# Patient Record
Sex: FEMALE | Race: WHITE | Employment: FULL TIME | URBAN - METROPOLITAN AREA
[De-identification: names, ages, dates, MRNs, and addresses within clinical notes are randomized per-mention and may not be internally consistent; named-entity substitution may affect disease eponyms.]

---

## 2017-08-12 PROCEDURE — 86480 TB TEST CELL IMMUN MEASURE: CPT | Performed by: INTERNAL MEDICINE

## 2017-08-12 PROCEDURE — 80074 ACUTE HEPATITIS PANEL: CPT | Performed by: INTERNAL MEDICINE

## 2017-08-12 PROCEDURE — 86200 CCP ANTIBODY: CPT | Performed by: INTERNAL MEDICINE

## 2017-09-02 ENCOUNTER — OFFICE VISIT (OUTPATIENT)
Dept: FAMILY MEDICINE CLINIC | Facility: CLINIC | Age: 47
End: 2017-09-02

## 2017-09-02 VITALS — DIASTOLIC BLOOD PRESSURE: 90 MMHG | SYSTOLIC BLOOD PRESSURE: 160 MMHG

## 2017-09-02 DIAGNOSIS — R03.0 ELEVATED BLOOD PRESSURE READING: ICD-10-CM

## 2017-09-02 DIAGNOSIS — Z01.30 BLOOD PRESSURE CHECK: Primary | ICD-10-CM

## 2017-09-02 NOTE — PROGRESS NOTES
Pt feels well, presents to [facility] for b/p check bc at last dr moffett b/p was found to be high @ 2-3 weeks ago per pt report. Pt feels well today, denies complaints. Denies cp, sob, headache.   B/p elevated in office today  I advised pt on TLC to lower b/p, wri

## 2017-09-02 NOTE — PATIENT INSTRUCTIONS
Discharge Instructions: Eating a Low-Salt Diet  Your health care provider has prescribed a low-salt diet for you. Most people with heart problems need to eat less salt, which is full of sodium.  Too much sodium is linked to high blood pressure, which is l © 4387-2468 The [address] [place] [address], [address] [place]. All rights reserved. This information is not intended as a substitute for professional medical care. Always follow your healthcare professional's instructions.         Eating · One and a half ounces cheese  Best choices: Skim or 1% milk, low-fat or fat-free yogurt or buttermilk, and low-fat cheeses.         Lean meats, poultry, fish  Servings: 6 or fewer a day  A serving is:  · 1 ounce cooked meats, poultry, or fish  · 1 egg  B Risk factors are things that make you more likely to have a disease or condition. Do you know your risk factors for high blood pressure? You can’t do anything about some risk factors. But other risk factors are things that can be changed.  Know what high bl © 3362-1534 [address], [address] [place]. All rights reserved. This information is not intended as a substitute for professional medical care. Always follow your healthcare professional's instructions.

## 2017-09-16 ENCOUNTER — OFFICE VISIT (OUTPATIENT)
Dept: FAMILY MEDICINE CLINIC | Facility: CLINIC | Age: 47
End: 2017-09-16

## 2017-09-16 VITALS — SYSTOLIC BLOOD PRESSURE: 122 MMHG | DIASTOLIC BLOOD PRESSURE: 78 MMHG

## 2017-09-16 DIAGNOSIS — Z01.30 BLOOD PRESSURE CHECK: Primary | ICD-10-CM

## 2017-09-16 NOTE — PROGRESS NOTES
Patient here for blood pressure check. /78 (BP Location: Right arm, Patient Position: Sitting, Cuff Size: large)   Patient given information. Will follow up with doctor as discussed. No charge for visit.

## 2017-09-24 ENCOUNTER — OFFICE VISIT (OUTPATIENT)
Dept: FAMILY MEDICINE CLINIC | Facility: CLINIC | Age: 47
End: 2017-09-24

## 2017-09-24 VITALS — DIASTOLIC BLOOD PRESSURE: 86 MMHG | SYSTOLIC BLOOD PRESSURE: 130 MMHG

## 2017-09-24 DIAGNOSIS — Z01.30 BLOOD PRESSURE CHECK: Primary | ICD-10-CM

## 2017-09-24 NOTE — PROGRESS NOTES
Pt presents for b/p check  Pt has eaten more salt this week  Will watch diet and RTC 1 week for recheck b/p

## 2017-10-05 PROBLEM — I77.810 DILATED AORTIC ROOT (HCC): Status: ACTIVE | Noted: 2017-10-05

## 2017-11-06 ENCOUNTER — OFFICE VISIT (OUTPATIENT)
Dept: FAMILY MEDICINE CLINIC | Facility: CLINIC | Age: 47
End: 2017-11-06

## 2017-11-06 VITALS — SYSTOLIC BLOOD PRESSURE: 138 MMHG | HEART RATE: 82 BPM | DIASTOLIC BLOOD PRESSURE: 82 MMHG

## 2017-11-06 DIAGNOSIS — Z01.30 BLOOD PRESSURE CHECK: Primary | ICD-10-CM

## 2017-11-07 ENCOUNTER — HOSPITAL ENCOUNTER (OUTPATIENT)
Dept: CT IMAGING | Facility: HOSPITAL | Age: 47
Discharge: HOME | End: 2017-11-07
Attending: INTERNAL MEDICINE
Payer: COMMERCIAL

## 2017-11-07 DIAGNOSIS — R07.2 PRECORDIAL PAIN: ICD-10-CM

## 2017-11-07 DIAGNOSIS — Q23.1 BICUSPID AORTIC VALVE: ICD-10-CM

## 2017-11-07 PROCEDURE — 75574 CT ANGIO HRT W/3D IMAGE: CPT | Performed by: INTERNAL MEDICINE

## 2017-11-07 RX ORDER — METOPROLOL TARTRATE 5 MG/5ML
INJECTION INTRAVENOUS
Status: COMPLETED
Start: 2017-11-07 | End: 2017-11-07

## 2017-11-07 RX ORDER — NITROGLYCERIN 0.4 MG/1
TABLET SUBLINGUAL
Status: COMPLETED
Start: 2017-11-07 | End: 2017-11-07

## 2017-11-07 RX ADMIN — NITROGLYCERIN 0.4 MG: 0.4 TABLET SUBLINGUAL at 09:57:00

## 2017-11-07 RX ADMIN — METOPROLOL TARTRATE 5 MG: 5 INJECTION INTRAVENOUS at 09:42:00

## 2017-11-07 RX ADMIN — METOPROLOL TARTRATE 5 MG: 5 INJECTION INTRAVENOUS at 09:29:00

## 2017-11-12 ENCOUNTER — OFFICE VISIT (OUTPATIENT)
Dept: FAMILY MEDICINE CLINIC | Facility: CLINIC | Age: 47
End: 2017-11-12

## 2017-11-12 VITALS — DIASTOLIC BLOOD PRESSURE: 82 MMHG | SYSTOLIC BLOOD PRESSURE: 132 MMHG | HEART RATE: 76 BPM

## 2017-11-12 DIAGNOSIS — Z01.30 BLOOD PRESSURE CHECK: Primary | ICD-10-CM

## 2017-11-12 NOTE — PROGRESS NOTES
Pt presented for BP check. Pt is aymptomatic. Exercises 3d/week in kickboxing class. Trying to watch diet.

## 2018-07-31 ENCOUNTER — NURSE ONLY (OUTPATIENT)
Dept: FAMILY MEDICINE CLINIC | Facility: CLINIC | Age: 48
End: 2018-07-31
Payer: COMMERCIAL

## 2018-07-31 VITALS — HEART RATE: 100 BPM | DIASTOLIC BLOOD PRESSURE: 80 MMHG | SYSTOLIC BLOOD PRESSURE: 160 MMHG

## 2018-07-31 DIAGNOSIS — R03.0 ELEVATED BLOOD PRESSURE READING: ICD-10-CM

## 2018-07-31 DIAGNOSIS — Z01.30 BLOOD PRESSURE CHECK: Primary | ICD-10-CM

## 2018-08-01 NOTE — PROGRESS NOTES
Patient here for BP check. States she is feeling a little off, with headaches and heart racing. /80    Instructed patient to call her PCP in the morning.

## 2018-08-23 PROBLEM — I10 BENIGN ESSENTIAL HTN: Status: ACTIVE | Noted: 2018-08-23

## 2018-08-23 PROBLEM — E66.01 OBESITY, MORBID, BMI 40.0-49.9 (HCC): Status: ACTIVE | Noted: 2018-08-23

## 2018-08-23 PROBLEM — R73.03 PREDIABETES: Status: ACTIVE | Noted: 2018-08-23

## 2018-08-23 PROBLEM — E88.81 INSULIN RESISTANCE: Status: ACTIVE | Noted: 2018-08-23

## 2019-11-12 PROCEDURE — 88305 TISSUE EXAM BY PATHOLOGIST: CPT | Performed by: OBSTETRICS & GYNECOLOGY

## 2023-07-10 ENCOUNTER — ANESTHESIA (OUTPATIENT)
Dept: SURGERY | Facility: HOSPITAL | Age: 53
End: 2023-07-10
Payer: COMMERCIAL

## 2023-07-10 ENCOUNTER — HOSPITAL ENCOUNTER (INPATIENT)
Facility: HOSPITAL | Age: 53
LOS: 1 days | Discharge: HOME | End: 2023-07-11
Attending: ORTHOPAEDIC SURGERY | Admitting: ORTHOPAEDIC SURGERY
Payer: COMMERCIAL

## 2023-07-10 ENCOUNTER — APPOINTMENT (OUTPATIENT)
Dept: GENERAL RADIOLOGY | Facility: HOSPITAL | Age: 53
End: 2023-07-10
Attending: ORTHOPAEDIC SURGERY
Payer: COMMERCIAL

## 2023-07-10 ENCOUNTER — ANESTHESIA EVENT (OUTPATIENT)
Dept: SURGERY | Facility: HOSPITAL | Age: 53
End: 2023-07-10
Payer: COMMERCIAL

## 2023-07-10 ENCOUNTER — HOSPITAL ENCOUNTER (INPATIENT)
Facility: HOSPITAL | Age: 53
LOS: 1 days | Discharge: HOME | DRG: 472 | End: 2023-07-11
Attending: ORTHOPAEDIC SURGERY | Admitting: ORTHOPAEDIC SURGERY
Payer: COMMERCIAL

## 2023-07-10 ENCOUNTER — APPOINTMENT (OUTPATIENT)
Dept: GENERAL RADIOLOGY | Facility: HOSPITAL | Age: 53
DRG: 472 | End: 2023-07-10
Attending: ORTHOPAEDIC SURGERY
Payer: COMMERCIAL

## 2023-07-10 PROBLEM — G95.9 CERVICAL MYELOPATHY (HCC): Status: ACTIVE | Noted: 2023-07-10

## 2023-07-10 LAB
B-HCG UR QL: NEGATIVE
GLUCOSE BLDC GLUCOMTR-MCNC: 91 MG/DL (ref 70–99)

## 2023-07-10 PROCEDURE — 0RB30ZZ EXCISION OF CERVICAL VERTEBRAL DISC, OPEN APPROACH: ICD-10-PCS | Performed by: ORTHOPAEDIC SURGERY

## 2023-07-10 PROCEDURE — 0RG10A0 FUSION OF CERVICAL VERTEBRAL JOINT WITH INTERBODY FUSION DEVICE, ANTERIOR APPROACH, ANTERIOR COLUMN, OPEN APPROACH: ICD-10-PCS | Performed by: ORTHOPAEDIC SURGERY

## 2023-07-10 PROCEDURE — 76000 FLUOROSCOPY <1 HR PHYS/QHP: CPT | Performed by: ORTHOPAEDIC SURGERY

## 2023-07-10 PROCEDURE — 82962 GLUCOSE BLOOD TEST: CPT

## 2023-07-10 PROCEDURE — 81025 URINE PREGNANCY TEST: CPT

## 2023-07-10 DEVICE — PLATE CERV SKY TI 1LVL 12MM: Type: IMPLANTABLE DEVICE | Site: SPINE CERVICAL | Status: FUNCTIONAL

## 2023-07-10 DEVICE — IMPLANTABLE DEVICE: Type: IMPLANTABLE DEVICE | Site: SPINE CERVICAL | Status: FUNCTIONAL

## 2023-07-10 DEVICE — 3.5MM VARIABLE SCREW, SELF TAPPING, 14MM
Type: IMPLANTABLE DEVICE | Site: SPINE CERVICAL | Status: FUNCTIONAL
Brand: SHORELINE ASC

## 2023-07-10 RX ORDER — NALOXONE HYDROCHLORIDE 0.4 MG/ML
80 INJECTION, SOLUTION INTRAMUSCULAR; INTRAVENOUS; SUBCUTANEOUS AS NEEDED
Status: CANCELLED | OUTPATIENT
Start: 2023-07-10 | End: 2023-07-10

## 2023-07-10 RX ORDER — MORPHINE SULFATE 4 MG/ML
4 INJECTION, SOLUTION INTRAMUSCULAR; INTRAVENOUS EVERY 10 MIN PRN
Status: DISCONTINUED | OUTPATIENT
Start: 2023-07-10 | End: 2023-07-10 | Stop reason: HOSPADM

## 2023-07-10 RX ORDER — HYDROMORPHONE HYDROCHLORIDE 1 MG/ML
0.4 INJECTION, SOLUTION INTRAMUSCULAR; INTRAVENOUS; SUBCUTANEOUS EVERY 2 HOUR PRN
Status: DISCONTINUED | OUTPATIENT
Start: 2023-07-10 | End: 2023-07-11

## 2023-07-10 RX ORDER — TRIAMCINOLONE ACETONIDE 40 MG/ML
INJECTION, SUSPENSION INTRA-ARTICULAR; INTRAMUSCULAR AS NEEDED
Status: DISCONTINUED | OUTPATIENT
Start: 2023-07-10 | End: 2023-07-10 | Stop reason: HOSPADM

## 2023-07-10 RX ORDER — DEXAMETHASONE SODIUM PHOSPHATE 4 MG/ML
VIAL (ML) INJECTION AS NEEDED
Status: DISCONTINUED | OUTPATIENT
Start: 2023-07-10 | End: 2023-07-10 | Stop reason: SURG

## 2023-07-10 RX ORDER — METHOCARBAMOL 750 MG/1
750 TABLET, FILM COATED ORAL EVERY 6 HOURS PRN
Status: DISCONTINUED | OUTPATIENT
Start: 2023-07-10 | End: 2023-07-11

## 2023-07-10 RX ORDER — KETAMINE HYDROCHLORIDE 50 MG/ML
INJECTION, SOLUTION, CONCENTRATE INTRAMUSCULAR; INTRAVENOUS AS NEEDED
Status: DISCONTINUED | OUTPATIENT
Start: 2023-07-10 | End: 2023-07-10 | Stop reason: SURG

## 2023-07-10 RX ORDER — HYDROMORPHONE HYDROCHLORIDE 1 MG/ML
0.1 INJECTION, SOLUTION INTRAMUSCULAR; INTRAVENOUS; SUBCUTANEOUS EVERY 2 HOUR PRN
Status: DISCONTINUED | OUTPATIENT
Start: 2023-07-10 | End: 2023-07-11

## 2023-07-10 RX ORDER — MORPHINE SULFATE 4 MG/ML
2 INJECTION, SOLUTION INTRAMUSCULAR; INTRAVENOUS EVERY 10 MIN PRN
Status: CANCELLED | OUTPATIENT
Start: 2023-07-10

## 2023-07-10 RX ORDER — POLYETHYLENE GLYCOL 3350 17 G/17G
17 POWDER, FOR SOLUTION ORAL DAILY PRN
Status: DISCONTINUED | OUTPATIENT
Start: 2023-07-10 | End: 2023-07-11

## 2023-07-10 RX ORDER — BISACODYL 10 MG
10 SUPPOSITORY, RECTAL RECTAL
Status: DISCONTINUED | OUTPATIENT
Start: 2023-07-10 | End: 2023-07-11

## 2023-07-10 RX ORDER — HYDROMORPHONE HYDROCHLORIDE 1 MG/ML
0.4 INJECTION, SOLUTION INTRAMUSCULAR; INTRAVENOUS; SUBCUTANEOUS EVERY 5 MIN PRN
Status: DISCONTINUED | OUTPATIENT
Start: 2023-07-10 | End: 2023-07-10 | Stop reason: HOSPADM

## 2023-07-10 RX ORDER — SODIUM CHLORIDE, SODIUM LACTATE, POTASSIUM CHLORIDE, CALCIUM CHLORIDE 600; 310; 30; 20 MG/100ML; MG/100ML; MG/100ML; MG/100ML
INJECTION, SOLUTION INTRAVENOUS CONTINUOUS
Status: CANCELLED | OUTPATIENT
Start: 2023-07-10

## 2023-07-10 RX ORDER — DIPHENHYDRAMINE HCL 25 MG
25 CAPSULE ORAL EVERY 4 HOURS PRN
Status: DISCONTINUED | OUTPATIENT
Start: 2023-07-10 | End: 2023-07-11

## 2023-07-10 RX ORDER — TIZANIDINE 2 MG/1
2 TABLET ORAL ONCE
Status: COMPLETED | OUTPATIENT
Start: 2023-07-10 | End: 2023-07-10

## 2023-07-10 RX ORDER — METOCLOPRAMIDE 10 MG/1
10 TABLET ORAL ONCE
Status: COMPLETED | OUTPATIENT
Start: 2023-07-10 | End: 2023-07-10

## 2023-07-10 RX ORDER — MIDAZOLAM HYDROCHLORIDE 1 MG/ML
INJECTION INTRAMUSCULAR; INTRAVENOUS AS NEEDED
Status: DISCONTINUED | OUTPATIENT
Start: 2023-07-10 | End: 2023-07-10 | Stop reason: SURG

## 2023-07-10 RX ORDER — PROCHLORPERAZINE EDISYLATE 5 MG/ML
5 INJECTION INTRAMUSCULAR; INTRAVENOUS EVERY 8 HOURS PRN
Status: DISCONTINUED | OUTPATIENT
Start: 2023-07-10 | End: 2023-07-11

## 2023-07-10 RX ORDER — LIDOCAINE HYDROCHLORIDE 10 MG/ML
INJECTION, SOLUTION EPIDURAL; INFILTRATION; INTRACAUDAL; PERINEURAL AS NEEDED
Status: DISCONTINUED | OUTPATIENT
Start: 2023-07-10 | End: 2023-07-10 | Stop reason: SURG

## 2023-07-10 RX ORDER — EPHEDRINE SULFATE 50 MG/ML
INJECTION, SOLUTION INTRAVENOUS AS NEEDED
Status: DISCONTINUED | OUTPATIENT
Start: 2023-07-10 | End: 2023-07-10 | Stop reason: SURG

## 2023-07-10 RX ORDER — NALOXONE HYDROCHLORIDE 0.4 MG/ML
80 INJECTION, SOLUTION INTRAMUSCULAR; INTRAVENOUS; SUBCUTANEOUS AS NEEDED
Status: DISCONTINUED | OUTPATIENT
Start: 2023-07-10 | End: 2023-07-10 | Stop reason: HOSPADM

## 2023-07-10 RX ORDER — OXYCODONE HYDROCHLORIDE 5 MG/1
10 TABLET ORAL ONCE
Status: COMPLETED | OUTPATIENT
Start: 2023-07-10 | End: 2023-07-10

## 2023-07-10 RX ORDER — HYDROMORPHONE HYDROCHLORIDE 1 MG/ML
INJECTION, SOLUTION INTRAMUSCULAR; INTRAVENOUS; SUBCUTANEOUS AS NEEDED
Status: DISCONTINUED | OUTPATIENT
Start: 2023-07-10 | End: 2023-07-10 | Stop reason: SURG

## 2023-07-10 RX ORDER — MORPHINE SULFATE 4 MG/ML
2 INJECTION, SOLUTION INTRAMUSCULAR; INTRAVENOUS EVERY 10 MIN PRN
Status: DISCONTINUED | OUTPATIENT
Start: 2023-07-10 | End: 2023-07-10 | Stop reason: HOSPADM

## 2023-07-10 RX ORDER — HYDROMORPHONE HYDROCHLORIDE 1 MG/ML
0.2 INJECTION, SOLUTION INTRAMUSCULAR; INTRAVENOUS; SUBCUTANEOUS EVERY 2 HOUR PRN
Status: DISCONTINUED | OUTPATIENT
Start: 2023-07-10 | End: 2023-07-11

## 2023-07-10 RX ORDER — HYDROMORPHONE HYDROCHLORIDE 1 MG/ML
0.2 INJECTION, SOLUTION INTRAMUSCULAR; INTRAVENOUS; SUBCUTANEOUS EVERY 5 MIN PRN
Status: CANCELLED | OUTPATIENT
Start: 2023-07-10

## 2023-07-10 RX ORDER — HYDROMORPHONE HYDROCHLORIDE 1 MG/ML
0.6 INJECTION, SOLUTION INTRAMUSCULAR; INTRAVENOUS; SUBCUTANEOUS EVERY 5 MIN PRN
Status: CANCELLED | OUTPATIENT
Start: 2023-07-10

## 2023-07-10 RX ORDER — ACETAMINOPHEN 500 MG
1000 TABLET ORAL ONCE
Status: COMPLETED | OUTPATIENT
Start: 2023-07-10 | End: 2023-07-10

## 2023-07-10 RX ORDER — VANCOMYCIN 2 GRAM/500 ML IN 0.9 % SODIUM CHLORIDE INTRAVENOUS
15 ONCE
Status: COMPLETED | OUTPATIENT
Start: 2023-07-10 | End: 2023-07-10

## 2023-07-10 RX ORDER — DOCUSATE SODIUM 100 MG/1
100 CAPSULE, LIQUID FILLED ORAL 2 TIMES DAILY
Status: DISCONTINUED | OUTPATIENT
Start: 2023-07-10 | End: 2023-07-11

## 2023-07-10 RX ORDER — HYDROMORPHONE HYDROCHLORIDE 1 MG/ML
0.6 INJECTION, SOLUTION INTRAMUSCULAR; INTRAVENOUS; SUBCUTANEOUS EVERY 5 MIN PRN
Status: DISCONTINUED | OUTPATIENT
Start: 2023-07-10 | End: 2023-07-10 | Stop reason: HOSPADM

## 2023-07-10 RX ORDER — ENEMA 19; 7 G/133ML; G/133ML
1 ENEMA RECTAL ONCE AS NEEDED
Status: DISCONTINUED | OUTPATIENT
Start: 2023-07-10 | End: 2023-07-11

## 2023-07-10 RX ORDER — FAMOTIDINE 20 MG/1
20 TABLET, FILM COATED ORAL ONCE
Status: COMPLETED | OUTPATIENT
Start: 2023-07-10 | End: 2023-07-10

## 2023-07-10 RX ORDER — HYDROCODONE BITARTRATE AND ACETAMINOPHEN 10; 325 MG/1; MG/1
1 TABLET ORAL EVERY 4 HOURS PRN
Status: DISCONTINUED | OUTPATIENT
Start: 2023-07-10 | End: 2023-07-11

## 2023-07-10 RX ORDER — MORPHINE SULFATE 10 MG/ML
6 INJECTION, SOLUTION INTRAMUSCULAR; INTRAVENOUS EVERY 10 MIN PRN
Status: CANCELLED | OUTPATIENT
Start: 2023-07-10

## 2023-07-10 RX ORDER — DIPHENHYDRAMINE HYDROCHLORIDE 50 MG/ML
25 INJECTION INTRAMUSCULAR; INTRAVENOUS EVERY 4 HOURS PRN
Status: DISCONTINUED | OUTPATIENT
Start: 2023-07-10 | End: 2023-07-11

## 2023-07-10 RX ORDER — SODIUM CHLORIDE 9 MG/ML
INJECTION, SOLUTION INTRAVENOUS CONTINUOUS PRN
Status: DISCONTINUED | OUTPATIENT
Start: 2023-07-10 | End: 2023-07-10 | Stop reason: SURG

## 2023-07-10 RX ORDER — MORPHINE SULFATE 10 MG/ML
6 INJECTION, SOLUTION INTRAMUSCULAR; INTRAVENOUS EVERY 10 MIN PRN
Status: DISCONTINUED | OUTPATIENT
Start: 2023-07-10 | End: 2023-07-10 | Stop reason: HOSPADM

## 2023-07-10 RX ORDER — HYDROCORTISONE 25 MG/G
CREAM TOPICAL 2 TIMES DAILY
Status: DISCONTINUED | OUTPATIENT
Start: 2023-07-10 | End: 2023-07-11

## 2023-07-10 RX ORDER — HYDROMORPHONE HYDROCHLORIDE 1 MG/ML
0.2 INJECTION, SOLUTION INTRAMUSCULAR; INTRAVENOUS; SUBCUTANEOUS EVERY 5 MIN PRN
Status: DISCONTINUED | OUTPATIENT
Start: 2023-07-10 | End: 2023-07-10 | Stop reason: HOSPADM

## 2023-07-10 RX ORDER — SODIUM CHLORIDE, SODIUM LACTATE, POTASSIUM CHLORIDE, CALCIUM CHLORIDE 600; 310; 30; 20 MG/100ML; MG/100ML; MG/100ML; MG/100ML
INJECTION, SOLUTION INTRAVENOUS CONTINUOUS
Status: DISCONTINUED | OUTPATIENT
Start: 2023-07-10 | End: 2023-07-11

## 2023-07-10 RX ORDER — HYDROMORPHONE HYDROCHLORIDE 1 MG/ML
0.4 INJECTION, SOLUTION INTRAMUSCULAR; INTRAVENOUS; SUBCUTANEOUS EVERY 5 MIN PRN
Status: CANCELLED | OUTPATIENT
Start: 2023-07-10

## 2023-07-10 RX ORDER — ONDANSETRON 2 MG/ML
4 INJECTION INTRAMUSCULAR; INTRAVENOUS EVERY 6 HOURS PRN
Status: DISCONTINUED | OUTPATIENT
Start: 2023-07-10 | End: 2023-07-11

## 2023-07-10 RX ORDER — SODIUM CHLORIDE, SODIUM LACTATE, POTASSIUM CHLORIDE, CALCIUM CHLORIDE 600; 310; 30; 20 MG/100ML; MG/100ML; MG/100ML; MG/100ML
INJECTION, SOLUTION INTRAVENOUS CONTINUOUS
Status: DISCONTINUED | OUTPATIENT
Start: 2023-07-10 | End: 2023-07-10 | Stop reason: HOSPADM

## 2023-07-10 RX ORDER — NYSTATIN 100000 U/G
1 OINTMENT TOPICAL 2 TIMES DAILY
Status: DISCONTINUED | OUTPATIENT
Start: 2023-07-10 | End: 2023-07-11

## 2023-07-10 RX ORDER — SENNOSIDES 8.6 MG
17.2 TABLET ORAL NIGHTLY
Status: DISCONTINUED | OUTPATIENT
Start: 2023-07-10 | End: 2023-07-11

## 2023-07-10 RX ORDER — MORPHINE SULFATE 4 MG/ML
4 INJECTION, SOLUTION INTRAMUSCULAR; INTRAVENOUS EVERY 10 MIN PRN
Status: CANCELLED | OUTPATIENT
Start: 2023-07-10

## 2023-07-10 RX ORDER — PHENYLEPHRINE HCL 10 MG/ML
VIAL (ML) INJECTION AS NEEDED
Status: DISCONTINUED | OUTPATIENT
Start: 2023-07-10 | End: 2023-07-10 | Stop reason: SURG

## 2023-07-10 RX ADMIN — HYDROMORPHONE HYDROCHLORIDE 0.5 MG: 1 INJECTION, SOLUTION INTRAMUSCULAR; INTRAVENOUS; SUBCUTANEOUS at 12:17:00

## 2023-07-10 RX ADMIN — DEXAMETHASONE SODIUM PHOSPHATE 10 MG: 4 MG/ML VIAL (ML) INJECTION at 13:11:00

## 2023-07-10 RX ADMIN — KETAMINE HYDROCHLORIDE 50 MG: 50 INJECTION, SOLUTION, CONCENTRATE INTRAMUSCULAR; INTRAVENOUS at 11:53:00

## 2023-07-10 RX ADMIN — PHENYLEPHRINE HCL 100 MCG: 10 MG/ML VIAL (ML) INJECTION at 12:53:00

## 2023-07-10 RX ADMIN — SODIUM CHLORIDE: 9 INJECTION, SOLUTION INTRAVENOUS at 13:41:00

## 2023-07-10 RX ADMIN — EPHEDRINE SULFATE 10 MG: 50 INJECTION, SOLUTION INTRAVENOUS at 13:00:00

## 2023-07-10 RX ADMIN — MIDAZOLAM HYDROCHLORIDE 2 MG: 1 INJECTION INTRAMUSCULAR; INTRAVENOUS at 10:58:00

## 2023-07-10 RX ADMIN — LIDOCAINE HYDROCHLORIDE 100 MG: 10 INJECTION, SOLUTION EPIDURAL; INFILTRATION; INTRACAUDAL; PERINEURAL at 11:00:00

## 2023-07-10 RX ADMIN — PHENYLEPHRINE HCL 100 MCG: 10 MG/ML VIAL (ML) INJECTION at 12:32:00

## 2023-07-10 RX ADMIN — SODIUM CHLORIDE: 9 INJECTION, SOLUTION INTRAVENOUS at 11:21:00

## 2023-07-10 RX ADMIN — KETAMINE HYDROCHLORIDE 50 MG: 50 INJECTION, SOLUTION, CONCENTRATE INTRAMUSCULAR; INTRAVENOUS at 12:39:00

## 2023-07-10 NOTE — ANESTHESIA POSTPROCEDURE EVALUATION
Patient: Alanna Pérez    Procedure Summary       Date: 07/10/23 Room / Location: [address] [place] [address] MAIN OR 12 / [address] [place] [address] MAIN OR    Anesthesia Start: 1055 Anesthesia Stop: 3655    Procedure: Cervical 5- cervical 6 anterior cervical discectomy fusion (Spine Cervical) Diagnosis: (Cervical myelopathy)    Surgeons: Torres Bone MD Anesthesiologist: Eliezer De La Fuente MD    Anesthesia Type: general ASA Status: 3            Anesthesia Type: general    Vitals Value Taken Time   /89 07/10/23 1352   Temp 97.5 07/10/23 1354   Pulse 98 07/10/23 1353   Resp 19 07/10/23 1353   SpO2 99 % 07/10/23 1353   Vitals shown include unvalidated device data.     [address] [place] [address] AN Post Evaluation:   Patient Evaluated in PACU  Patient Participation: complete - patient participated  Level of Consciousness: awake  Pain Score: 2  Pain Management: adequate  Airway Patency:patent  Dental exam unchanged from preop  Yes    Cardiovascular Status: hemodynamically stable  Respiratory Status: spontaneous ventilation  Postoperative Hydration euvolemic      Jh Helton MD  7/10/2023 1:54 PM

## 2023-07-10 NOTE — ANESTHESIA PROCEDURE NOTES
Airway  Date/Time: 7/10/2023 11:13 AM  Urgency: elective      General Information and Staff    Patient location during procedure: OR  Anesthesiologist: Jason Shipman MD  Performed: anesthesiologist   Performed by: Jason Shipman MD  Authorized by: Jason Shipman MD      Indications and Patient Condition  Indications for airway management: anesthesia  Sedation level: deep  Preoxygenated: yes  Patient position: sniffing  Mask difficulty assessment: 1 - vent by mask    Final Airway Details  Final airway type: endotracheal airway      Successful airway: ETT  Cuffed: yes   Successful intubation technique: direct laryngoscopy  Endotracheal tube insertion site: oral  ETT size (mm): 7.0    Cormack-Lehane Classification: grade IIA - partial view of glottis  Placement verified by: capnometry   Measured from: lips  ETT to lips (cm): 22  Number of attempts at approach: 1

## 2023-07-11 VITALS
SYSTOLIC BLOOD PRESSURE: 136 MMHG | RESPIRATION RATE: 14 BRPM | DIASTOLIC BLOOD PRESSURE: 69 MMHG | HEIGHT: 66 IN | TEMPERATURE: 98 F | BODY MASS INDEX: 47.09 KG/M2 | WEIGHT: 293 LBS | HEART RATE: 76 BPM | OXYGEN SATURATION: 91 %

## 2023-07-11 LAB
ANION GAP SERPL CALC-SCNC: 8 MMOL/L (ref 0–18)
BUN BLD-MCNC: 10 MG/DL (ref 7–18)
BUN/CREAT SERPL: 15.9 (ref 10–20)
CALCIUM BLD-MCNC: 9.1 MG/DL (ref 8.5–10.1)
CHLORIDE SERPL-SCNC: 110 MMOL/L (ref 98–112)
CO2 SERPL-SCNC: 24 MMOL/L (ref 21–32)
CREAT BLD-MCNC: 0.63 MG/DL
GFR SERPLBLD BASED ON 1.73 SQ M-ARVRAT: 107 ML/MIN/1.73M2 (ref 60–?)
GLUCOSE BLD-MCNC: 131 MG/DL (ref 70–99)
HCT VFR BLD AUTO: 42.2 %
HGB BLD-MCNC: 14 G/DL
MAGNESIUM SERPL-MCNC: 2.2 MG/DL (ref 1.6–2.6)
OSMOLALITY SERPL CALC.SUM OF ELEC: 295 MOSM/KG (ref 275–295)
POTASSIUM SERPL-SCNC: 4.2 MMOL/L (ref 3.5–5.1)
SODIUM SERPL-SCNC: 142 MMOL/L (ref 136–145)

## 2023-07-11 PROCEDURE — 97165 OT EVAL LOW COMPLEX 30 MIN: CPT

## 2023-07-11 PROCEDURE — 80048 BASIC METABOLIC PNL TOTAL CA: CPT

## 2023-07-11 PROCEDURE — 97530 THERAPEUTIC ACTIVITIES: CPT

## 2023-07-11 PROCEDURE — 85018 HEMOGLOBIN: CPT

## 2023-07-11 PROCEDURE — 97535 SELF CARE MNGMENT TRAINING: CPT

## 2023-07-11 PROCEDURE — 97162 PT EVAL MOD COMPLEX 30 MIN: CPT

## 2023-07-11 PROCEDURE — 85014 HEMATOCRIT: CPT

## 2023-07-11 PROCEDURE — 83735 ASSAY OF MAGNESIUM: CPT | Performed by: INTERNAL MEDICINE

## 2023-07-11 NOTE — PHYSICAL THERAPY NOTE
PHYSICAL THERAPY EVALUATION - INPATIENT     Room Number: Room 2/Room 2-A  Evaluation Date: 7/11/2023  Type of Evaluation: Initial   Physician Order: PT Eval and Treat    Presenting Problem: C spine fusion     Reason for Therapy: Mobility Dysfunction and Discharge Planning    PHYSICAL THERAPY ASSESSMENT     Patient is a 46year old female admitted 7/10/2023 for C spine fusion performed 7/10, no brace required. Patient's current functional deficits include bed mobility, transfers, and gait, which are below the patient's pre-admission status. Patient was independent prior to this admission. Functional Mobility: Gait belt used throughout mobility. - bed mobility: supine to/from sit SBA with log roll, cues for sequencing   - sitting balance independent    - transfers: sit to/from stand transfers with supervision from EOB, commode   - standing balance supv    - gait: ambulation with no  ft with supervision, guarded C spine/shoulder movements   - 8 stairs SBA with HR      At end of session patient in chair with all needs in reach, RN aware. The patient's Approx Degree of Impairment: 35.83% has been calculated based on documentation in the [facility] '6 clicks' Inpatient Basic Mobility Short Form. Research supports that patients with this level of impairment may benefit from home with no needs. Patient will benefit from continued IP PT services to address these deficits in preparation for discharge. DISCHARGE RECOMMENDATIONS  PT Discharge Recommendations: Home    PLAN  PT Treatment Plan: Bed mobility; Body mechanics; Endurance; Energy conservation;Patient education;Gait training;Neuromuscular re-educate;Strengthening;Stair training;Transfer training;Balance training  Rehab Potential : Fair  Frequency (Obs): Daily       PHYSICAL THERAPY MEDICAL/SOCIAL HISTORY     History related to current admission: C spine pain      Problem List  Active Problems:    Cervical myelopathy ([facility] [address]      [address] [facility] Situation  Type of Home: House  Home Layout: Multi-level (raised ranch)  Stairs to [employer] : 10  Railing: Yes  Stairs to [employer]: 14  Railing: Yes  Lives With:  (college age dtr, father will be staying with her to help out)  Drives: Yes  Patient Regularly Uses: None     Prior Level of [place]: independent     SUBJECTIVE  \"My daughter is home from college, she can help me out. \"     PHYSICAL THERAPY EXAMINATION     OBJECTIVE  Precautions: Bed/chair alarm  Fall Risk: Standard fall risk    WEIGHT BEARING RESTRICTION      No restrictions           PAIN ASSESSMENT     Location: mild ache at surgical site       COGNITION  Overall Cognitive Status:  WFL - within functional limits    BALANCE  Static Sitting: Good  Dynamic Sitting: Good  Static Standing: Good  Dynamic Standing: Good      ACTIVITY TOLERANCE           BP: 152/86  BP Location: Left arm  BP Method: Automatic  Patient Position: Sitting    O2 WALK  Oxygen Therapy  SPO2% on Room Air at Rest: 95    AM-PAC '6-Clicks' INPATIENT SHORT FORM - BASIC MOBILITY  How much difficulty does the patient currently have. .. Patient Difficulty: Turning over in bed (including adjusting bedclothes, sheets and blankets)?: None   Patient Difficulty: Sitting down on and standing up from a chair with arms (e.g., wheelchair, bedside commode, etc.): None   Patient Difficulty: Moving from lying on back to sitting on the side of the bed?: A Little   How much help from another person does the patient currently need. ..    Help from Another: Moving to and from a bed to a chair (including a wheelchair)?: A Little   Help from Another: Need to walk in hospital room?: A Little   Help from Another: Climbing 3-5 steps with a railing?: A Little     AM-PAC Score:  Raw Score: 20   Approx Degree of Impairment: 35.83%   Standardized Score (AM-PAC Scale): 47.67   CMS Modifier (G-Code): CJ    FUNCTIONAL ABILITY STATUS  Functional Mobility/Gait Assessment  Gait Assistance: Supervision  Distance (ft): 50', 250'  Assistive Device: None  Pattern: Within Functional Limits  Stairs: Stairs  How Many Stairs: 8  Device: 1 Rail  Assist: Supervision  Pattern: Ascend and Descend  Ascend and Descend : Reciprocal    Bed Mobility: SBA/supv log roll     Transfers: supv     Exercise/Education Provided:  Bed mobility  Body mechanics  Functional activity tolerated  Gait training  Transfer training    Patient End of Session: Up in chair;Needs met;Call light within reach;RN aware of session/findings; All patient questions and concerns addressed    CURRENT GOALS    Goals to be met by: 7/30  Patient Goal Patient's self-stated goal is: unstated    Goal #1 Patient is able to demonstrate supine - sit EOB @ level: independent     Goal #1   Current Status    Goal #2 Patient is able to demonstrate transfers Sit to/from Stand at assistance level: independent with none     Goal #2  Current Status    Goal #3 Patient is able to ambulate 500 feet with assist device: none at assistance level: independent   Goal #3   Current Status    Goal #4 Patient will negotiate 12 stairs/one curb w/ assistive device and supervision   Goal #4   Current Status    Goal #5 Patient to demonstrate independence with home activity/exercise instructions provided to patient in preparation for discharge.    Goal #5   Current Status    Goal #6    Goal #6  Current Status      Patient Evaluation Complexity Level:  History Moderate - 1 or 2 personal factors and/or co-morbidities   Examination of body systems Moderate - addressing a total of 3 or more elements   Clinical Presentation Moderate - Evolving   Clinical Decision Making Moderate Complexity       Gait Training: 10 minutes

## 2023-07-27 NOTE — OPERATIVE REPORT
Operative Note     Patient Name: Freddie Goldberg  Preoperative Diagnosis: Myelopathy  Postoperative Diagnosis: Same  Primary Surgeon: James Strong MD  Assistant: Mimi Boogie PA-C  Procedures:   1.) C5/6 Anterior Cervical Discectomy/Fusion CPT 21819   2.) Insertion of Interbody Biomechanical Device CPT 27662  3.) C5/6 Anterior Instrumentation   CPT 28862  4.) Use of Cellular Allograft     CPT 65274  5.) Use of Local Bone Autograft   CPT 73446  Surgical Findings: Cervical Spondylosis  Anesthesia: General Endotracheal Anesthesia  Estimated Blood Loss: 15cc  Drains: None  Implants:   Depuy-Synthes [place] Anterior Cervical Plate  Depuy-Synthes ProTi Cage   Depuy- Synthes Screws   Vivigen Cellular Aullograft  Specimen: None  Condition: Stable  Complications: None    Surgical Indications:   Freddie Godlberg is an 46year old female who presented with a history of myelopathy. Workup included radiographs and axial imaging. The patient was diagnosed with cervical stenosis. The patient's symptoms were recalcitrant to non-surgical care including activity modificiation, oral medications, PT and injection therapy. The option of surgery was discussed with the patient. The risks, benefits, and alternatives were discussed. Risks include, but are not limited wound complications, bleeding, infection, neurologic injury or onset of a new neurological issue including paralysis, dural tear, pseudoarthrosis (particularly in longer constructs and in smokers), fracture, hardware failure, adjacent segment or junctional breakdown, recurrent laryngeal nerve palsy, Paradise's syndrome, dysphagia or dysphonia (These are usually temporary and will resolve within a few weeks to few months timeframe), hematoma requiring evacuation, repeat intubation after surgery, esophageal or tracheal injury, vertebral artery injury (stroke), and the need for possible additional future surgery.   We also discussed the possible persistence of symptoms and adjacent segment degeneration. The patient verbalized their understanding and wished to proceed with surgery. Surgical Procedure: The patient was met in the preop holding area, we reviewed elements of the patient's history and physical examination along with the planned surgical procedure. The patient was in agreement and the surgical site was marked with indelible ink. The patient was brought back to the operative theater. The patient was administered prophylactic antibiotics per SCIP guidelines. After successful induction of general endotracheal anesthesia, sequential compression devices were applied to bilateral lower extremities. The patient was then positioned supine on the operating table. The head was positioned carefully on a padded circular head rest. The patient's orbits, peripheral nerves, and bony prominences were padded and protected. Using a surgical instrument a radiograph was taken to localize the skin incision relative to the patient's anatomy. The neck was then prepped and draped in the usual sterile fashion. A safety timeout was performed identifying the patient by name, MRN, and date of birth; the nature of the procedure, surgical site, and concerns were addressed with the operating room staff. All were in agreement and we proceeded with the procedure. A transverse incision, consistent with the Andrade-Farmer approach, was made at the C5/6 level on the left. The skin was incised with a scalpel and dissection was carried out with electrocautery. The subplatysmal membrane was then dissected free. The plane between the deep cervical fascia and the carotid sheath was developed bluntly. The carotid was palpated, protected, and retracted laterally. After exposing the prevertebral fascia, radiographic marker was placed in the .  A radiograph was then obtained to confirm the appropriate surgical level(s) prior to proceeding with discectomy.   Referencing off the radiograph, the C5/6 interspace was developed and the edges of the longus coli muscles elevated from their undersurface with bipolar electrocautery. The Trimline retractor of appropriate size was placed. [place] pins were then placed at the most cephalad and caudal vertebral bodies. The microscope was draped and used for the remainder of the procedure. After distracting the space, a #15 blade was used to create an initial annulotomy. This was extended laterally using Kerrison rongeurs. The overhanging osteophyte at the cephalad cervical body was resected. Using Ramonita curettes and Kerrison Rongeurs, the discectomy was completed. After decompressing the posterior osteophytes and the neuroforamen, the PLL was resected. A blunt nerve probe can then be passed into the bilateral neural foramen without any resistance. Next high-speed bur was utilized to decorticate the most lateral aspects of the disc space. Next we sequentially trialed and then placed an appropriate sized interbody deice packed with allograft and autograft into position. Local bone graft was then deposited into the disc space on the lateral aspects. At this point the caspar pins were then removed, hemostasis was achieved an appropriate sized DePuy Synthes [place] plate, which is separate and not integral to the cage was selected and applied. Screws were placed and a radiograph obtained to confirm position of the graft and evaluate plate fixation     All bleeders were meticulously controlled using bipolar and unipolar electrocautery. The wound was thoroughly irrigated at the time of closure. There was no active bleeding. 1cc of 40mg Kenalog was placed in the retropharyngeal space in a flowseal matrix. Closure was done in layers with interrupted 2-0 Vicryl for the fascia. The skin was closed with a Monocryl suture. Steri-Strips and a sterile dressing were then applied.   The patient was woken from anesthesia and transferred to the PACU in stable condition. All counts were correct x2 at the end of the procedure. A physician assistant was necessary during the case to provide retraction and to manage wound suction so that I could operate with two hands under the surgical microscope. Cullen Dumont.  Cyndra Lundborg, MD  Division of Spine Surgery  [facility]

## 2024-11-01 LAB
HAV IGM SERPL QL IA: NORMAL
HBV CORE IGM SERPL QL IA: NORMAL
HBV SURFACE AG SERPL QL IA: NORMAL
HCV AB SERPL QL IA: NORMAL

## (undated) DEVICE — SUT SILK 2-0 SA85H

## (undated) DEVICE — SUT VICRYL 3-0 CP-2 J761D

## (undated) DEVICE — SPONGE: SPECIALTY PEANUT XR 100/CS: Brand: MEDICAL ACTION INDUSTRIES

## (undated) DEVICE — GAUZE TRAY STERILE 4X4 12PLY

## (undated) DEVICE — SUT MONOCRYL 3-0 PS-1 Y936H

## (undated) DEVICE — C-ARMOR C-ARM EQUIPMENT COVERS CLEAR STERILE UNIVERSAL FIT 12 PER CASE: Brand: C-ARMOR

## (undated) DEVICE — E-Z BUTTON SWITCH PENCIL

## (undated) DEVICE — GAMMEX® PI HYBRID SIZE 8.5, STERILE POWDER-FREE SURGICAL GLOVE, POLYISOPRENE AND NEOPRENE BLEND: Brand: GAMMEX

## (undated) DEVICE — TUBING MEGADYNE SPECULUM

## (undated) DEVICE — APPLICATOR CHLORAPREP 10.5ML

## (undated) DEVICE — SOL NACL IRRIG 0.9% 1000ML BTL

## (undated) DEVICE — DRAPE SHEET LARGE 76X55

## (undated) DEVICE — PEN: MARKING STD PT 100/CS: Brand: MEDICAL ACTION INDUSTRIES

## (undated) DEVICE — 3M™ TEGADERM™ TRANSPARENT FILM DRESSING, 1626W, 4 IN X 4-3/4 IN (10 CM X 12 CM), 50 EACH/CARTON, 4 CARTON/CASE: Brand: 3M™ TEGADERM™

## (undated) DEVICE — FORCEP CUSH BAY BP DISP 7.5IN

## (undated) DEVICE — MICRO KOVER: Brand: UNBRANDED

## (undated) DEVICE — SKYLINE ANTERIOR CERVICAL PLATE SYSTEM DRILL 2.2 X 14MM: Brand: SKYLINE

## (undated) DEVICE — CERVICAL CDS: Brand: MEDLINE INDUSTRIES, INC.

## (undated) DEVICE — DRAPE SRG PED 124X74IN LAP 2

## (undated) DEVICE — FLOSEAL HEMOSTATIC MATRIX, 5ML: Brand: FLOSEAL HEMOSTATIC MATRIX

## (undated) DEVICE — 3.0MM PRECISION NEURO (MATCH HEAD)

## (undated) DEVICE — TZ MEDICAL TITANIUM DISTRACTION PINS 12MM: Brand: TZ MEDICAL TITANIUM DISTRACTION PINS

## (undated) DEVICE — GOWN SURG AERO BLUE PERF LG